# Patient Record
Sex: MALE | Race: WHITE | URBAN - METROPOLITAN AREA
[De-identification: names, ages, dates, MRNs, and addresses within clinical notes are randomized per-mention and may not be internally consistent; named-entity substitution may affect disease eponyms.]

---

## 2017-09-06 ENCOUNTER — OFFICE VISIT (OUTPATIENT)
Dept: GASTROENTEROLOGY | Age: 32
End: 2017-09-06

## 2017-09-06 VITALS
WEIGHT: 292 LBS | SYSTOLIC BLOOD PRESSURE: 132 MMHG | OXYGEN SATURATION: 98 % | HEIGHT: 72 IN | DIASTOLIC BLOOD PRESSURE: 86 MMHG | HEART RATE: 72 BPM | BODY MASS INDEX: 39.55 KG/M2 | TEMPERATURE: 98.1 F | RESPIRATION RATE: 16 BRPM

## 2017-09-06 DIAGNOSIS — K21.9 GASTROESOPHAGEAL REFLUX DISEASE, ESOPHAGITIS PRESENCE NOT SPECIFIED: ICD-10-CM

## 2017-09-06 DIAGNOSIS — E66.9 OBESITY, UNSPECIFIED OBESITY SEVERITY, UNSPECIFIED OBESITY TYPE: ICD-10-CM

## 2017-09-06 DIAGNOSIS — B18.2 HEPATITIS C CARRIER (HCC): Primary | ICD-10-CM

## 2017-09-06 DIAGNOSIS — R79.89 ABNORMAL LFTS: ICD-10-CM

## 2017-09-06 PROCEDURE — 99204 OFFICE O/P NEW MOD 45 MIN: CPT | Performed by: INTERNAL MEDICINE

## 2017-09-06 RX ORDER — OMEPRAZOLE 40 MG/1
40 CAPSULE, DELAYED RELEASE ORAL DAILY
Qty: 30 CAPSULE | Refills: 3 | Status: SHIPPED | OUTPATIENT
Start: 2017-09-06

## 2017-09-06 ASSESSMENT — ENCOUNTER SYMPTOMS
ANAL BLEEDING: 0
EYES NEGATIVE: 1
ABDOMINAL PAIN: 1
ABDOMINAL DISTENTION: 0
RECTAL PAIN: 0
RESPIRATORY NEGATIVE: 1
DIARRHEA: 0
NAUSEA: 0
VOMITING: 0
BLOOD IN STOOL: 0
CONSTIPATION: 0

## 2017-10-11 ENCOUNTER — TELEPHONE (OUTPATIENT)
Dept: GASTROENTEROLOGY | Age: 32
End: 2017-10-11

## 2023-12-09 DIAGNOSIS — I10 ESSENTIAL (PRIMARY) HYPERTENSION: ICD-10-CM

## 2023-12-09 DIAGNOSIS — E03.9 HYPOTHYROIDISM, UNSPECIFIED: ICD-10-CM

## 2023-12-11 RX ORDER — LOSARTAN POTASSIUM 25 MG/1
25 TABLET ORAL DAILY
Qty: 90 TABLET | Refills: 0 | Status: SHIPPED | OUTPATIENT
Start: 2023-12-11 | End: 2024-03-19

## 2023-12-11 RX ORDER — LEVOTHYROXINE SODIUM 175 UG/1
175 TABLET ORAL DAILY
Qty: 90 TABLET | Refills: 1 | Status: SHIPPED | OUTPATIENT
Start: 2023-12-11

## 2024-01-23 ENCOUNTER — APPOINTMENT (OUTPATIENT)
Dept: PRIMARY CARE | Facility: CLINIC | Age: 39
End: 2024-01-23
Payer: COMMERCIAL

## 2024-01-24 ENCOUNTER — LAB (OUTPATIENT)
Dept: LAB | Facility: LAB | Age: 39
End: 2024-01-24
Payer: COMMERCIAL

## 2024-01-24 ENCOUNTER — OFFICE VISIT (OUTPATIENT)
Dept: PRIMARY CARE | Facility: CLINIC | Age: 39
End: 2024-01-24
Payer: COMMERCIAL

## 2024-01-24 VITALS
HEART RATE: 69 BPM | WEIGHT: 279.2 LBS | HEIGHT: 72 IN | DIASTOLIC BLOOD PRESSURE: 89 MMHG | SYSTOLIC BLOOD PRESSURE: 123 MMHG | BODY MASS INDEX: 37.82 KG/M2 | TEMPERATURE: 97.4 F

## 2024-01-24 DIAGNOSIS — Z87.19 HX OF ACUTE PANCREATITIS: ICD-10-CM

## 2024-01-24 DIAGNOSIS — E03.9 HYPOTHYROIDISM, UNSPECIFIED TYPE: ICD-10-CM

## 2024-01-24 DIAGNOSIS — Z00.00 WELL ADULT HEALTH CHECK: ICD-10-CM

## 2024-01-24 DIAGNOSIS — I10 ESSENTIAL HYPERTENSION: ICD-10-CM

## 2024-01-24 DIAGNOSIS — B18.2 CHRONIC HEPATITIS C WITHOUT HEPATIC COMA (MULTI): ICD-10-CM

## 2024-01-24 DIAGNOSIS — K74.00 HEPATIC FIBROSIS: ICD-10-CM

## 2024-01-24 DIAGNOSIS — F17.210 CIGARETTE SMOKER: ICD-10-CM

## 2024-01-24 DIAGNOSIS — F31.9 BIPOLAR DISEASE, CHRONIC (MULTI): ICD-10-CM

## 2024-01-24 DIAGNOSIS — F31.81 BIPOLAR II DISORDER (MULTI): ICD-10-CM

## 2024-01-24 DIAGNOSIS — I10 ESSENTIAL HYPERTENSION: Primary | ICD-10-CM

## 2024-01-24 PROBLEM — R10.9 ABDOMINAL PAIN: Status: ACTIVE | Noted: 2017-07-28

## 2024-01-24 PROBLEM — S67.10XA CRUSH INJURY TO FINGER: Status: ACTIVE | Noted: 2024-01-24

## 2024-01-24 PROBLEM — B34.9 VIRAL SYNDROME: Status: ACTIVE | Noted: 2024-01-24

## 2024-01-24 PROBLEM — E66.01 MORBID OBESITY (MULTI): Status: ACTIVE | Noted: 2017-07-31

## 2024-01-24 PROBLEM — L73.8 OTHER SPECIFIED FOLLICULAR DISORDERS: Status: ACTIVE | Noted: 2020-09-29

## 2024-01-24 PROBLEM — M75.112 NONTRAUMATIC INCOMPLETE TEAR OF LEFT ROTATOR CUFF: Status: ACTIVE | Noted: 2024-01-24

## 2024-01-24 PROBLEM — I35.1 AORTIC VALVE REGURGITATION, ACQUIRED: Status: ACTIVE | Noted: 2024-01-24

## 2024-01-24 PROBLEM — M62.830 MUSCLE SPASM OF BACK: Status: ACTIVE | Noted: 2024-01-24

## 2024-01-24 PROBLEM — R42 VERTIGO: Status: ACTIVE | Noted: 2024-01-24

## 2024-01-24 PROBLEM — M75.82 ROTATOR CUFF TENDONITIS, LEFT: Status: ACTIVE | Noted: 2024-01-24

## 2024-01-24 PROBLEM — B35.4 TINEA CORPORIS: Status: ACTIVE | Noted: 2020-09-29

## 2024-01-24 PROBLEM — M19.90 DJD (DEGENERATIVE JOINT DISEASE): Status: ACTIVE | Noted: 2024-01-24

## 2024-01-24 PROBLEM — H81.399 PERIPHERAL VERTIGO: Status: ACTIVE | Noted: 2024-01-24

## 2024-01-24 PROBLEM — R21 RASH AND OTHER NONSPECIFIC SKIN ERUPTION: Status: ACTIVE | Noted: 2020-09-29

## 2024-01-24 PROBLEM — F41.9 ANXIETY: Status: ACTIVE | Noted: 2024-01-24

## 2024-01-24 PROBLEM — K64.5 THROMBOSED EXTERNAL HEMORRHOID: Status: ACTIVE | Noted: 2024-01-24

## 2024-01-24 PROBLEM — R79.89 ABNORMAL LFTS: Status: ACTIVE | Noted: 2017-07-31

## 2024-01-24 PROBLEM — M54.9 BACK PAIN: Status: ACTIVE | Noted: 2024-01-24

## 2024-01-24 PROBLEM — R07.9 CHEST PAIN: Status: ACTIVE | Noted: 2024-01-24

## 2024-01-24 PROBLEM — K92.1 BLOOD IN STOOL: Status: ACTIVE | Noted: 2024-01-24

## 2024-01-24 PROBLEM — H81.10 BPPV (BENIGN PAROXYSMAL POSITIONAL VERTIGO): Status: ACTIVE | Noted: 2024-01-24

## 2024-01-24 PROBLEM — I77.810 DILATED AORTIC ROOT (CMS-HCC): Status: ACTIVE | Noted: 2024-01-24

## 2024-01-24 PROBLEM — H90.42 SENSORINEURAL HEARING LOSS (SNHL) OF LEFT EAR WITH UNRESTRICTED HEARING OF RIGHT EAR: Status: ACTIVE | Noted: 2024-01-24

## 2024-01-24 PROBLEM — K64.9 HEMORRHOID: Status: ACTIVE | Noted: 2024-01-24

## 2024-01-24 PROBLEM — H92.09 EAR PAIN: Status: ACTIVE | Noted: 2024-01-24

## 2024-01-24 PROBLEM — K62.89 ANORECTAL PAIN: Status: ACTIVE | Noted: 2024-01-24

## 2024-01-24 PROBLEM — M25.512 ACUTE PAIN OF LEFT SHOULDER: Status: ACTIVE | Noted: 2024-01-24

## 2024-01-24 PROBLEM — H90.3 ASYMMETRIC SNHL (SENSORINEURAL HEARING LOSS): Status: ACTIVE | Noted: 2024-01-24

## 2024-01-24 PROBLEM — M75.102 ROTATOR CUFF TEAR, LEFT: Status: ACTIVE | Noted: 2024-01-24

## 2024-01-24 PROBLEM — B19.20 HEPATITIS C VIRUS INFECTION: Status: ACTIVE | Noted: 2024-01-24

## 2024-01-24 PROBLEM — J06.9 URTI (ACUTE UPPER RESPIRATORY INFECTION): Status: ACTIVE | Noted: 2024-01-24

## 2024-01-24 PROBLEM — M99.02 SEGMENTAL AND SOMATIC DYSFUNCTION OF THORACIC REGION: Status: ACTIVE | Noted: 2024-01-24

## 2024-01-24 PROBLEM — B35.1 ONYCHOMYCOSIS OF NAIL OF DIGIT OF HAND: Status: ACTIVE | Noted: 2024-01-24

## 2024-01-24 PROBLEM — K74.02 ADVANCED HEPATIC FIBROSIS: Status: ACTIVE | Noted: 2024-01-24

## 2024-01-24 PROBLEM — M12.819 ROTATOR CUFF ARTHROPATHY: Status: ACTIVE | Noted: 2024-01-24

## 2024-01-24 PROBLEM — E78.5 DYSLIPIDEMIA: Status: ACTIVE | Noted: 2024-01-24

## 2024-01-24 LAB
ALBUMIN SERPL BCP-MCNC: 4.6 G/DL (ref 3.4–5)
ALP SERPL-CCNC: 78 U/L (ref 33–120)
ALT SERPL W P-5'-P-CCNC: 12 U/L (ref 10–52)
ANION GAP SERPL CALC-SCNC: 11 MMOL/L (ref 10–20)
AST SERPL W P-5'-P-CCNC: 14 U/L (ref 9–39)
BASOPHILS # BLD AUTO: 0.05 X10*3/UL (ref 0–0.1)
BASOPHILS NFR BLD AUTO: 0.8 %
BILIRUB SERPL-MCNC: 0.5 MG/DL (ref 0–1.2)
BUN SERPL-MCNC: 8 MG/DL (ref 6–23)
CALCIUM SERPL-MCNC: 9.5 MG/DL (ref 8.6–10.3)
CHLORIDE SERPL-SCNC: 103 MMOL/L (ref 98–107)
CHOLEST SERPL-MCNC: 207 MG/DL (ref 0–199)
CHOLESTEROL/HDL RATIO: 6.7
CO2 SERPL-SCNC: 30 MMOL/L (ref 21–32)
CREAT SERPL-MCNC: 0.91 MG/DL (ref 0.5–1.3)
EGFRCR SERPLBLD CKD-EPI 2021: >90 ML/MIN/1.73M*2
EOSINOPHIL # BLD AUTO: 0.07 X10*3/UL (ref 0–0.7)
EOSINOPHIL NFR BLD AUTO: 1.1 %
ERYTHROCYTE [DISTWIDTH] IN BLOOD BY AUTOMATED COUNT: 12 % (ref 11.5–14.5)
GLUCOSE SERPL-MCNC: 75 MG/DL (ref 74–99)
HCT VFR BLD AUTO: 51 % (ref 41–52)
HDLC SERPL-MCNC: 30.8 MG/DL
HGB BLD-MCNC: 17.5 G/DL (ref 13.5–17.5)
IMM GRANULOCYTES # BLD AUTO: 0.02 X10*3/UL (ref 0–0.7)
IMM GRANULOCYTES NFR BLD AUTO: 0.3 % (ref 0–0.9)
LDLC SERPL CALC-MCNC: 129 MG/DL
LIPASE SERPL-CCNC: 52 U/L (ref 9–82)
LYMPHOCYTES # BLD AUTO: 2.45 X10*3/UL (ref 1.2–4.8)
LYMPHOCYTES NFR BLD AUTO: 36.8 %
MCH RBC QN AUTO: 30.1 PG (ref 26–34)
MCHC RBC AUTO-ENTMCNC: 34.3 G/DL (ref 32–36)
MCV RBC AUTO: 88 FL (ref 80–100)
MONOCYTES # BLD AUTO: 0.5 X10*3/UL (ref 0.1–1)
MONOCYTES NFR BLD AUTO: 7.5 %
NEUTROPHILS # BLD AUTO: 3.57 X10*3/UL (ref 1.2–7.7)
NEUTROPHILS NFR BLD AUTO: 53.5 %
NON HDL CHOLESTEROL: 176 MG/DL (ref 0–149)
NRBC BLD-RTO: 0 /100 WBCS (ref 0–0)
PLATELET # BLD AUTO: 255 X10*3/UL (ref 150–450)
POTASSIUM SERPL-SCNC: 4.4 MMOL/L (ref 3.5–5.3)
PROT SERPL-MCNC: 7.9 G/DL (ref 6.4–8.2)
RBC # BLD AUTO: 5.82 X10*6/UL (ref 4.5–5.9)
SODIUM SERPL-SCNC: 140 MMOL/L (ref 136–145)
THYROPEROXIDASE AB SERPL-ACNC: >1000 IU/ML
TRIGL SERPL-MCNC: 237 MG/DL (ref 0–149)
TSH SERPL-ACNC: 0.52 MIU/L (ref 0.44–3.98)
VLDL: 47 MG/DL (ref 0–40)
WBC # BLD AUTO: 6.7 X10*3/UL (ref 4.4–11.3)

## 2024-01-24 PROCEDURE — 99214 OFFICE O/P EST MOD 30 MIN: CPT | Performed by: INTERNAL MEDICINE

## 2024-01-24 PROCEDURE — 3008F BODY MASS INDEX DOCD: CPT | Performed by: INTERNAL MEDICINE

## 2024-01-24 PROCEDURE — 85025 COMPLETE CBC W/AUTO DIFF WBC: CPT

## 2024-01-24 PROCEDURE — 36415 COLL VENOUS BLD VENIPUNCTURE: CPT

## 2024-01-24 PROCEDURE — 80061 LIPID PANEL: CPT

## 2024-01-24 PROCEDURE — 3079F DIAST BP 80-89 MM HG: CPT | Performed by: INTERNAL MEDICINE

## 2024-01-24 PROCEDURE — 80053 COMPREHEN METABOLIC PANEL: CPT

## 2024-01-24 PROCEDURE — 86376 MICROSOMAL ANTIBODY EACH: CPT

## 2024-01-24 PROCEDURE — 84443 ASSAY THYROID STIM HORMONE: CPT

## 2024-01-24 PROCEDURE — 3074F SYST BP LT 130 MM HG: CPT | Performed by: INTERNAL MEDICINE

## 2024-01-24 PROCEDURE — 83690 ASSAY OF LIPASE: CPT

## 2024-01-24 RX ORDER — ZIPRASIDONE HYDROCHLORIDE 60 MG/1
60 CAPSULE ORAL DAILY
COMMUNITY
Start: 2023-11-10

## 2024-01-24 RX ORDER — TRIAMCINOLONE ACETONIDE 1 MG/G
1 CREAM TOPICAL 2 TIMES DAILY
COMMUNITY
Start: 2020-04-06

## 2024-01-24 ASSESSMENT — PATIENT HEALTH QUESTIONNAIRE - PHQ9
2. FEELING DOWN, DEPRESSED OR HOPELESS: NOT AT ALL
1. LITTLE INTEREST OR PLEASURE IN DOING THINGS: NOT AT ALL
SUM OF ALL RESPONSES TO PHQ9 QUESTIONS 1 AND 2: 0

## 2024-01-24 NOTE — PROGRESS NOTES
Assessment/Plan   Problem List Items Addressed This Visit       Hepatic fibrosis    Bipolar II disorder (CMS/HCC)    Cigarette smoker    Essential hypertension - Primary    Relevant Orders    Comprehensive Metabolic Panel    Lipid Panel    Hypothyroid    Relevant Orders    CBC and Auto Differential    Lipid Panel    TSH with reflex to Free T4 if abnormal    Thyroid Peroxidase (TPO) Antibody    Hx of acute pancreatitis    Relevant Orders    CBC and Auto Differential    Lipase     Other Visit Diagnoses       Well adult health check        Relevant Orders    Comprehensive Metabolic Panel          He had an episode of pancreatitis in February when his lipase was very much elevated but ultrasound of the gallbladder was negative he was not aware of it I am repeating the lipase to make sure we are not missing something important further testing will be dependent upon that  Other labs also being ordered  All these conditions and current medications no change  Follow-up in 3 months time  He had expiratory wheeze in the lungs consistent with the smoke effect and will need a pulmonary function test we will discuss it at the next visit    Subjective   Patient ID: Kofi Lozano is a 38 y.o. male who presents for Follow-up.    Past Surgical History:   Procedure Laterality Date    OTHER SURGICAL HISTORY  09/17/2019    Finger fracture repair    OTHER SURGICAL HISTORY  11/12/2021    Back surgery    OTHER SURGICAL HISTORY  11/12/2021    Hemorrhoidectomy      Family History   Problem Relation Name Age of Onset    Heart disease Mother      Stroke Mother        Social History     Socioeconomic History    Marital status: Single     Spouse name: Not on file    Number of children: Not on file    Years of education: Not on file    Highest education level: Not on file   Occupational History    Not on file   Tobacco Use    Smoking status: Every Day     Packs/day: .5     Types: Cigarettes    Smokeless tobacco: Never   Substance and Sexual  Return call to patient. We are still waiting on the rep and when we are going to be able to do the procedure. Writer explained to the patient. Patient states that he is going to have to think it over, he does not want to wait. States he will wait one more month for an answer of when we will be scheduling the Rezum procedure. Patient has no other questions.   Activity    Alcohol use: Not Currently    Drug use: Yes     Types: Marijuana    Sexual activity: Not on file   Other Topics Concern    Not on file   Social History Narrative    Not on file     Social Determinants of Health     Financial Resource Strain: Not on file   Food Insecurity: Not on file   Transportation Needs: Not on file   Physical Activity: Not on file   Stress: Not on file   Social Connections: Not on file   Intimate Partner Violence: Not on file   Housing Stability: Not on file      Latex   Current Outpatient Medications   Medication Sig Dispense Refill    levothyroxine (Synthroid, Levoxyl) 175 mcg tablet TAKE 1 TABLET BY MOUTH EVERY DAY 90 tablet 1    losartan (Cozaar) 25 mg tablet TAKE 1 TABLET BY MOUTH EVERY DAY 90 tablet 0    triamcinolone (Kenalog) 0.1 % cream Apply 1 Application topically 2 times a day.      ziprasidone (Geodon) 60 mg capsule Take 1 capsule (60 mg) by mouth once daily.       No current facility-administered medications for this visit.      Vitals:    01/24/24 0926   BP: 123/89   BP Location: Left arm   Patient Position: Sitting   Pulse: 69   Temp: 36.3 °C (97.4 °F)   Weight: 127 kg (279 lb 3.2 oz)   Height: 1.829 m (6')      Problem List Items Addressed This Visit       Hepatic fibrosis    Bipolar II disorder (CMS/HCC)    Cigarette smoker    Essential hypertension - Primary    Relevant Orders    Comprehensive Metabolic Panel    Lipid Panel    Hypothyroid    Relevant Orders    CBC and Auto Differential    Lipid Panel    TSH with reflex to Free T4 if abnormal    Thyroid Peroxidase (TPO) Antibody    Hx of acute pancreatitis    Relevant Orders    CBC and Auto Differential    Lipase     Other Visit Diagnoses       Well adult health check        Relevant Orders    Comprehensive Metabolic Panel           Orders Placed This Encounter   Procedures    CBC and Auto Differential     Standing Status:   Future     Standing Expiration Date:   1/24/2025     Order Specific Question:   Release  "result to MyChart     Answer:   Immediate    Comprehensive Metabolic Panel     Standing Status:   Future     Standing Expiration Date:   1/24/2025     Order Specific Question:   Release result to MyChart     Answer:   Immediate    Lipid Panel     Standing Status:   Future     Standing Expiration Date:   1/24/2025     Order Specific Question:   Release result to MyChart     Answer:   Immediate    TSH with reflex to Free T4 if abnormal     Standing Status:   Future     Standing Expiration Date:   1/24/2025     Order Specific Question:   Release result to MyChart     Answer:   Immediate    Lipase     Standing Status:   Future     Standing Expiration Date:   1/24/2025     Order Specific Question:   Release result to MyChart     Answer:   Immediate [1]    Thyroid Peroxidase (TPO) Antibody     Standing Status:   Future     Standing Expiration Date:   1/24/2025     Order Specific Question:   [facility]     Answer:   Immediate [1]        HPI  Came in for periodic review  Eventually he will need refill  Systemic inquiry is negative  Unfortunately continues to smoke    ROS otherwise has been negative  He had abdominal pain in February and he had a lipase elevated which she did not know when he went to the ED and ultrasound was negative for gallstones but he is asymptomatic currently  Past medical history reviewed  Social and family history reviewed  Allergies and medications reviewed  Recent labs reviewed  Vital signs reviewed  PHYSICAL EXAM  Heart sounds regular  Chest shows bilateral expiratory wheeze  Abdomen soft nontender neuro awake alert      No results found for: \"PR1\", \"BMPR1A\", \"CMPLAS\", \"OA1SBJMD\", \"KPSAT\"   Lab Results   Component Value Date    CHOL 169 02/22/2022    CHHDL 5.5 (A) 02/22/2022                "

## 2024-03-17 DIAGNOSIS — I10 ESSENTIAL (PRIMARY) HYPERTENSION: ICD-10-CM

## 2024-03-19 RX ORDER — LOSARTAN POTASSIUM 25 MG/1
25 TABLET ORAL DAILY
Qty: 90 TABLET | Refills: 1 | Status: SHIPPED | OUTPATIENT
Start: 2024-03-19

## 2024-04-24 ENCOUNTER — APPOINTMENT (OUTPATIENT)
Dept: PRIMARY CARE | Facility: CLINIC | Age: 39
End: 2024-04-24
Payer: COMMERCIAL

## 2024-04-30 ENCOUNTER — OFFICE VISIT (OUTPATIENT)
Dept: PRIMARY CARE | Facility: CLINIC | Age: 39
End: 2024-04-30
Payer: COMMERCIAL

## 2024-04-30 VITALS
HEIGHT: 72 IN | SYSTOLIC BLOOD PRESSURE: 127 MMHG | DIASTOLIC BLOOD PRESSURE: 81 MMHG | BODY MASS INDEX: 37.33 KG/M2 | WEIGHT: 275.6 LBS | HEART RATE: 62 BPM | TEMPERATURE: 97.3 F

## 2024-04-30 DIAGNOSIS — Z00.00 WELL ADULT HEALTH CHECK: ICD-10-CM

## 2024-04-30 DIAGNOSIS — F31.81 BIPOLAR II DISORDER (MULTI): ICD-10-CM

## 2024-04-30 DIAGNOSIS — B18.2 CHRONIC HEPATITIS C WITHOUT HEPATIC COMA (MULTI): ICD-10-CM

## 2024-04-30 DIAGNOSIS — F17.210 CIGARETTE SMOKER: ICD-10-CM

## 2024-04-30 DIAGNOSIS — I10 ESSENTIAL HYPERTENSION: Primary | ICD-10-CM

## 2024-04-30 DIAGNOSIS — E03.9 HYPOTHYROIDISM, UNSPECIFIED TYPE: ICD-10-CM

## 2024-04-30 DIAGNOSIS — E78.5 DYSLIPIDEMIA: ICD-10-CM

## 2024-04-30 PROBLEM — K92.1 HEMATOCHEZIA: Status: ACTIVE | Noted: 2024-01-24

## 2024-04-30 PROBLEM — L73.9 FOLLICULITIS: Status: ACTIVE | Noted: 2024-04-30

## 2024-04-30 PROBLEM — Z20.822 CONTACT WITH AND (SUSPECTED) EXPOSURE TO COVID-19: Status: ACTIVE | Noted: 2023-02-23

## 2024-04-30 PROBLEM — K40.90 LEFT INGUINAL HERNIA: Status: ACTIVE | Noted: 2024-04-30

## 2024-04-30 PROBLEM — H91.90 HEARING LOSS: Status: ACTIVE | Noted: 2024-04-30

## 2024-04-30 PROBLEM — R55 PRE-SYNCOPE: Status: ACTIVE | Noted: 2024-04-30

## 2024-04-30 PROBLEM — R31.9 HEMATURIA: Status: ACTIVE | Noted: 2023-02-23

## 2024-04-30 PROBLEM — N20.9 UROLITHIASIS: Status: ACTIVE | Noted: 2024-04-30

## 2024-04-30 PROBLEM — M75.100 NONTRAUMATIC TEAR OF ROTATOR CUFF: Status: ACTIVE | Noted: 2024-04-30

## 2024-04-30 PROBLEM — K64.3 GRADE IV HEMORRHOIDS: Status: ACTIVE | Noted: 2024-04-30

## 2024-04-30 PROCEDURE — 3079F DIAST BP 80-89 MM HG: CPT | Performed by: INTERNAL MEDICINE

## 2024-04-30 PROCEDURE — 3074F SYST BP LT 130 MM HG: CPT | Performed by: INTERNAL MEDICINE

## 2024-04-30 PROCEDURE — 3008F BODY MASS INDEX DOCD: CPT | Performed by: INTERNAL MEDICINE

## 2024-04-30 PROCEDURE — 99214 OFFICE O/P EST MOD 30 MIN: CPT | Performed by: INTERNAL MEDICINE

## 2024-04-30 ASSESSMENT — PATIENT HEALTH QUESTIONNAIRE - PHQ9
2. FEELING DOWN, DEPRESSED OR HOPELESS: NOT AT ALL
SUM OF ALL RESPONSES TO PHQ9 QUESTIONS 1 AND 2: 0
1. LITTLE INTEREST OR PLEASURE IN DOING THINGS: NOT AT ALL

## 2024-04-30 NOTE — PROGRESS NOTES
Assessment/Plan   Problem List Items Addressed This Visit       Bipolar II disorder (Multi)    Cigarette smoker    Dyslipidemia    Essential hypertension - Primary    Relevant Orders    Comprehensive Metabolic Panel    Chronic hepatitis C without hepatic coma (Multi)    Relevant Orders    Comprehensive Metabolic Panel    Hypothyroid    Relevant Orders    TSH with reflex to Free T4 if abnormal    Thyroid Peroxidase (TPO) Antibody    Well adult health check     Other Visit Diagnoses       BMI 37.0-37.9, adult            Advised to do lab work in 3 months and follow-up in 3 months  He continues to have some cough associated with current smoking which is limited  Advised against it  His BMI is elevated diet and exercise and losing weight was discussed    Subjective   Patient ID: Kofi Lozano is a 39 y.o. male who presents for Follow-up (3 months follow up.).    Past Surgical History:   Procedure Laterality Date    OTHER SURGICAL HISTORY  09/17/2019    Finger fracture repair    OTHER SURGICAL HISTORY  11/12/2021    Back surgery    OTHER SURGICAL HISTORY  11/12/2021    Hemorrhoidectomy      Family History   Problem Relation Name Age of Onset    Heart disease Mother      Stroke Mother        Social History     Socioeconomic History    Marital status: Single     Spouse name: Not on file    Number of children: Not on file    Years of education: Not on file    Highest education level: Not on file   Occupational History    Not on file   Tobacco Use    Smoking status: Every Day     Current packs/day: 0.50     Types: Cigarettes    Smokeless tobacco: Never   Substance and Sexual Activity    Alcohol use: Not Currently    Drug use: Yes     Types: Marijuana    Sexual activity: Not on file   Other Topics Concern    Not on file   Social History Narrative    Not on file     Social Determinants of Health     Financial Resource Strain: Not on file   Food Insecurity: Not on file   Transportation Needs: Not on file   Physical Activity:  Not on file   Stress: Not on file   Social Connections: Not on file   Intimate Partner Violence: Not on file   Housing Stability: Not on file      Latex   Current Outpatient Medications   Medication Sig Dispense Refill    levothyroxine (Synthroid, Levoxyl) 175 mcg tablet TAKE 1 TABLET BY MOUTH EVERY DAY 90 tablet 1    losartan (Cozaar) 25 mg tablet TAKE 1 TABLET BY MOUTH EVERY DAY 90 tablet 1    triamcinolone (Kenalog) 0.1 % cream Apply 1 Application topically 2 times a day.      ziprasidone (Geodon) 60 mg capsule Take 1 capsule (60 mg) by mouth once daily.       No current facility-administered medications for this visit.      Vitals:    04/30/24 0925   BP: 127/81   BP Location: Left arm   Patient Position: Sitting   Pulse: 62   Temp: 36.3 °C (97.3 °F)   Weight: 125 kg (275 lb 9.6 oz)   Height: 1.829 m (6')      Problem List Items Addressed This Visit       Bipolar II disorder (Multi)    Cigarette smoker    Dyslipidemia    Essential hypertension - Primary    Relevant Orders    Comprehensive Metabolic Panel    Chronic hepatitis C without hepatic coma (Multi)    Relevant Orders    Comprehensive Metabolic Panel    Hypothyroid    Relevant Orders    TSH with reflex to Free T4 if abnormal    Thyroid Peroxidase (TPO) Antibody    Well adult health check     Other Visit Diagnoses       BMI 37.0-37.9, adult               Orders Placed This Encounter   Procedures    Comprehensive Metabolic Panel     Standing Status:   Future     Standing Expiration Date:   4/30/2025     Order Specific Question:   Release result to MyChart     Answer:   Immediate    TSH with reflex to Free T4 if abnormal     Standing Status:   Future     Standing Expiration Date:   4/30/2025     Order Specific Question:   Release result to MyChart     Answer:   Immediate    Thyroid Peroxidase (TPO) Antibody     Standing Status:   Future     Standing Expiration Date:   4/30/2025     Order Specific Question:   Release result to MyChart     Answer:   Immediate  "[1]        HPI  Came for periodic review  He does not go to hepatologist he said he has been treated for hep C  He is not able to lose weight  Otherwise doing fine  He follows a psychiatrist and conditions is said to be stable    ROS  All systemic inquiry is negative  Past medical history reviewed  Social and family history reviewed  Allergies and medications reviewed  Recent labs reviewed  Vital signs reviewed    PHYSICAL EXAM  Cardiovascular chest abdominal neurological examination is normal      No results found for: \"PR1\", \"BMPR1A\", \"CMPLAS\", \"AV5XLYPO\", \"KPSAT\"   Lab Results   Component Value Date    CHOL 207 (H) 01/24/2024    LDLCALC 129 (H) 01/24/2024    CHHDL 6.7 01/24/2024                "

## 2024-06-12 DIAGNOSIS — E03.9 HYPOTHYROIDISM, UNSPECIFIED: ICD-10-CM

## 2024-06-13 RX ORDER — LEVOTHYROXINE SODIUM 175 UG/1
175 TABLET ORAL DAILY
Qty: 90 TABLET | Refills: 0 | Status: SHIPPED | OUTPATIENT
Start: 2024-06-13

## 2024-09-14 DIAGNOSIS — E03.9 HYPOTHYROIDISM, UNSPECIFIED: ICD-10-CM

## 2024-09-14 DIAGNOSIS — I10 ESSENTIAL (PRIMARY) HYPERTENSION: ICD-10-CM

## 2024-09-17 RX ORDER — LOSARTAN POTASSIUM 25 MG/1
25 TABLET ORAL DAILY
Qty: 90 TABLET | Refills: 0 | Status: SHIPPED | OUTPATIENT
Start: 2024-09-17

## 2024-09-17 RX ORDER — LEVOTHYROXINE SODIUM 175 UG/1
175 TABLET ORAL DAILY
Qty: 90 TABLET | Refills: 0 | Status: SHIPPED | OUTPATIENT
Start: 2024-09-17

## 2024-12-21 DIAGNOSIS — I10 ESSENTIAL (PRIMARY) HYPERTENSION: ICD-10-CM

## 2024-12-21 DIAGNOSIS — E03.9 HYPOTHYROIDISM, UNSPECIFIED: ICD-10-CM

## 2024-12-26 RX ORDER — LEVOTHYROXINE SODIUM 175 UG/1
175 TABLET ORAL DAILY
Qty: 90 TABLET | Refills: 0 | Status: SHIPPED | OUTPATIENT
Start: 2024-12-26

## 2024-12-26 RX ORDER — LOSARTAN POTASSIUM 25 MG/1
25 TABLET ORAL DAILY
Qty: 90 TABLET | Refills: 0 | Status: SHIPPED | OUTPATIENT
Start: 2024-12-26

## 2025-03-25 DIAGNOSIS — E03.9 HYPOTHYROIDISM, UNSPECIFIED: ICD-10-CM

## 2025-03-25 DIAGNOSIS — I10 ESSENTIAL (PRIMARY) HYPERTENSION: ICD-10-CM

## 2025-03-27 RX ORDER — LEVOTHYROXINE SODIUM 175 UG/1
175 TABLET ORAL DAILY
Qty: 90 TABLET | Refills: 0 | Status: SHIPPED | OUTPATIENT
Start: 2025-03-27

## 2025-03-27 RX ORDER — LOSARTAN POTASSIUM 25 MG/1
25 TABLET ORAL DAILY
Qty: 90 TABLET | Refills: 0 | Status: SHIPPED | OUTPATIENT
Start: 2025-03-27

## 2025-04-22 ENCOUNTER — APPOINTMENT (OUTPATIENT)
Dept: PRIMARY CARE | Facility: CLINIC | Age: 40
End: 2025-04-22
Payer: COMMERCIAL

## 2025-04-22 VITALS
DIASTOLIC BLOOD PRESSURE: 82 MMHG | HEART RATE: 71 BPM | OXYGEN SATURATION: 97 % | WEIGHT: 293 LBS | TEMPERATURE: 97.3 F | HEIGHT: 72 IN | SYSTOLIC BLOOD PRESSURE: 139 MMHG | BODY MASS INDEX: 39.68 KG/M2

## 2025-04-22 DIAGNOSIS — E66.09 CLASS 2 OBESITY DUE TO EXCESS CALORIES WITH BODY MASS INDEX (BMI) OF 39.0 TO 39.9 IN ADULT, UNSPECIFIED WHETHER SERIOUS COMORBIDITY PRESENT: ICD-10-CM

## 2025-04-22 DIAGNOSIS — Z00.00 WELL ADULT HEALTH CHECK: ICD-10-CM

## 2025-04-22 DIAGNOSIS — E03.9 HYPOTHYROIDISM, UNSPECIFIED TYPE: ICD-10-CM

## 2025-04-22 DIAGNOSIS — E66.812 CLASS 2 OBESITY DUE TO EXCESS CALORIES WITH BODY MASS INDEX (BMI) OF 39.0 TO 39.9 IN ADULT, UNSPECIFIED WHETHER SERIOUS COMORBIDITY PRESENT: ICD-10-CM

## 2025-04-22 DIAGNOSIS — E78.5 DYSLIPIDEMIA: ICD-10-CM

## 2025-04-22 DIAGNOSIS — Z87.891 FORMER SMOKER: ICD-10-CM

## 2025-04-22 DIAGNOSIS — I10 ESSENTIAL HYPERTENSION: ICD-10-CM

## 2025-04-22 PROCEDURE — 3075F SYST BP GE 130 - 139MM HG: CPT | Performed by: INTERNAL MEDICINE

## 2025-04-22 PROCEDURE — 99396 PREV VISIT EST AGE 40-64: CPT | Performed by: INTERNAL MEDICINE

## 2025-04-22 PROCEDURE — 3079F DIAST BP 80-89 MM HG: CPT | Performed by: INTERNAL MEDICINE

## 2025-04-22 PROCEDURE — 3008F BODY MASS INDEX DOCD: CPT | Performed by: INTERNAL MEDICINE

## 2025-04-22 PROCEDURE — 1036F TOBACCO NON-USER: CPT | Performed by: INTERNAL MEDICINE

## 2025-04-22 ASSESSMENT — ENCOUNTER SYMPTOMS
OCCASIONAL FEELINGS OF UNSTEADINESS: 0
LOSS OF SENSATION IN FEET: 0
DEPRESSION: 0

## 2025-04-22 NOTE — PROGRESS NOTES
Assessment/Plan   Problem List Items Addressed This Visit       Dyslipidemia    Relevant Orders    Lipid Panel    Essential hypertension    Relevant Orders    Comprehensive Metabolic Panel    Hypothyroid    Relevant Orders    Thyroid Stimulating Hormone    T4, free    Well adult health check    Relevant Orders    Hemoglobin A1C    Comprehensive Metabolic Panel    CBC    Former smoker     Other Visit Diagnoses         Class 2 obesity due to excess calories with body mass index (BMI) of 39.0 to 39.9 in adult, unspecified whether serious comorbidity present        Relevant Orders    Referral to Bariatric Surgery    Referral to Nutrition Services        Discussed the preventive care  He had to stop smoking  Weight: 133 kg (293 lb)   Is overweight too much  Advised to see bariatrics dietitian for diet exercise  Also optimization of thyroid status is important  Labs has not been done must be done as ordered  Follow-up in 3 months unless otherwise indicated    Subjective   Patient ID: Kofi Lozano is a 40 y.o. male who presents for Annual Exam (No concerns).    Surgical History[1]   Family History[2]   Social History     Socioeconomic History    Marital status: Single     Spouse name: Not on file    Number of children: Not on file    Years of education: Not on file    Highest education level: Not on file   Occupational History    Not on file   Tobacco Use    Smoking status: Former     Current packs/day: 0.00     Types: Cigarettes     Quit date: 2024     Years since quittin.5    Smokeless tobacco: Never   Vaping Use    Vaping status: Every Day   Substance and Sexual Activity    Alcohol use: Not Currently    Drug use: Yes     Types: Marijuana    Sexual activity: Not on file   Other Topics Concern    Not on file   Social History Narrative    Not on file     Social Drivers of Health     Financial Resource Strain: Not on file   Food Insecurity: Not on file   Transportation Needs: Not on file   Physical Activity: Not  on file   Stress: Not on file   Social Connections: Not on file   Intimate Partner Violence: Not on file   Housing Stability: Not on file      Latex   Current Rx[3]   Vitals:    04/22/25 0918   BP: 139/82   BP Location: Left arm   Patient Position: Sitting   Pulse: 71   Temp: 36.3 °C (97.3 °F)   TempSrc: Skin   SpO2: 97%   Weight: 133 kg (293 lb)   Height: 1.829 m (6')      Problem List Items Addressed This Visit       Dyslipidemia    Relevant Orders    Lipid Panel    Essential hypertension    Relevant Orders    Comprehensive Metabolic Panel    Hypothyroid    Relevant Orders    Thyroid Stimulating Hormone    T4, free    Well adult health check    Relevant Orders    Hemoglobin A1C    Comprehensive Metabolic Panel    CBC    Former smoker     Other Visit Diagnoses         Class 2 obesity due to excess calories with body mass index (BMI) of 39.0 to 39.9 in adult, unspecified whether serious comorbidity present        Relevant Orders    Referral to Bariatric Surgery    Referral to Nutrition Services           Orders Placed This Encounter   Procedures    Lipid Panel     Standing Status:   Future     Number of Occurrences:   1     Expected Date:   4/22/2025     Expiration Date:   4/22/2026     Release result to OSSIANIX[facility]:   Immediate    Hemoglobin A1C     Standing Status:   Future     Number of Occurrences:   1     Expected Date:   4/22/2025     Expiration Date:   4/22/2026     Release result to [facility]asc[facility]:   Immediate    Thyroid Stimulating Hormone     Standing Status:   Future     Number of Occurrences:   1     Expected Date:   4/22/2025     Expiration Date:   4/22/2026     Release result to [facility]:   Immediate    Comprehensive Metabolic Panel     Standing Status:   Future     Number of Occurrences:   1     Expected Date:   4/22/2025     Expiration Date:   4/22/2026     Release result to OSSIANIXt:   Immediate    CBC     Standing Status:   Future     Number of Occurrences:   1     Expected Date:   4/22/2025     Expiration  "Date:   4/22/2026     Release result to Captalis:   Immediate    T4, free     Standing Status:   Future     Number of Occurrences:   1     Expected Date:   4/22/2025     Expiration Date:   4/22/2026     Release result to Captalis:   Immediate [1]    Referral to Bariatric Surgery     Standing Status:   Future     Expected Date:   4/22/2025     Expiration Date:   4/22/2026     Referral Priority:   Routine     Referral Type:   Consultation     Referral Reason:   Specialty Services Required     Requested Specialty:   Bariatrics     Number of Visits Requested:   1    Referral to Nutrition Services     Standing Status:   Future     Expected Date:   4/22/2025     Expiration Date:   4/22/2026     Referral Priority:   Routine     Referral Type:   Consultation     Referral Reason:   Specialty Services Required     Requested Specialty:   Nutrition     Number of Visits Requested:   1        HPI  18-year-old gentleman with history of hypertension obesity hypothyroidism with Hashimoto's thyroiditis as well as bipolar disorder  He says his mood is fine he has been under care of psychiatrist  He has stopped smoking but he is unable to lose weight and he was seen in    ROS  Negative  Past medical history reviewed  Social and family history reviewed  Allergies and medications reviewed  Recent labs reviewed  Vital signs reviewed  PHYSICAL EXAM  Vascular chest abdominal neurological exam changes normal      No results found for: \"PR1\", \"BMPR1A\", \"CMPLAS\", \"II2TFVKT\", \"KPSAT\"   Lab Results   Component Value Date    CHOL 207 (H) 01/24/2024    LDLCALC 129 (H) 01/24/2024    CHHDL 6.7 01/24/2024     Lab Results   Component Value Date    TSH 0.52 01/24/2024    HGBA1C 5.1 12/29/2020              === 12/29/20 ===    CT CHEST ANGIO W AND WO IV CONTRAST    - Impression -  Mild aneurysmal dilatation of the ascending thoracic aorta measuring  4.0 x 4.1 cm. No evidence for intramural hematoma or aortic  dissection.    Additional findings as described " above.    Results for orders placed in visit on 12/29/20    Echocardiogram    Narrative  [facility] [place]  [address] [address], [facility] [address]  Tel [phone] Fax [phone]    TRANSTHORACIC ECHOCARDIOGRAM REPORT      Patient Name:     JOCE Loredo Physician:   [id number] Lavelle Ayoub MD  Study Date:       12/29/2020         Referring Physician: [id number] Washington  MRN/PID:          [id number]           PCP:  Accession/Order#: [id number]          Department Location: [address]  YOB: 1985           Fellow:  Gender:           M                  Nurse:  Admit Date:       12/29/2020         Sonographer:         Debbie Lopez RDCS  Admission Status: Inpatient -        Additional Staff:  Routine  Height:           182.00 cm          CC Report to:  Weight:           129.00 kg          Study Type:          Echocardiogram  BSA:              2.47 m2  Blood Pressure: 141 /81 mmHg    Diagnosis/ICD: R07.89-Other chest pain  Indication:  Procedure/CPT: Echo Complete w Full [id number]  Study Detail: The following Echo studies were performed: 2D, M-Mode, Doppler and  color flow.      PHYSICIAN INTERPRETATION:  Left Ventricle: The left ventricular systolic function is normal. Estimated left ventricular mass is normal. There are no regional wall motion abnormalities. The left ventricular cavity size is normal. The left ventricular septal wall thickness is normal. There is normal left ventricular posterior wall thickness. Spectral Doppler shows a normal pattern of left ventricular diastolic filling. There are normal left ventricular filling pressures.  LV Wall Scoring:  All segments are normal.    Left Atrium: The left atrium is normal in size.  Right Ventricle: The right ventricle is normal in size. There is normal right ventricular global systolic function.  Right Atrium: The right atrium is normal in size.  Aortic Valve: The aortic valve appears abnormal. There is mild aortic valve cusp  calcification. There is mild aortic valve thickening. There is mild aortic valve regurgitation. The peak instantaneous gradient of the aortic valve is 9.1 mmHg. Eccentric AR towards anterior leaflet mild, morphology of Aortic valve uncertain, can not fully exclude bicuspid or pseudobicuspid with Non coronary and R cusp raphe fusion, could be just calcified R cusp of trileaflet valve, give mild aortopathy consider former, if indicated additional imaging.  Mitral Valve: The mitral valve is normal in structure. There is no evidence of mitral valve regurgitation.  Tricuspid Valve: The tricuspid valve is structurally normal. No evidence of tricuspid regurgitation. The Doppler estimated RVSP is within normal limits at 31.1 mmHg.  Pulmonic Valve: The pulmonic valve is structurally normal. There is no indication of pulmonic valve regurgitation.  Pericardium: There is no pericardial effusion noted.  Aorta: The aortic root is normal. There is mild dilatation of the ascending aorta. There is mild dilatation of the aortic root. Mild aortopathy root 3.57 and asc 4.2, arch not seen.  Systemic Veins: The inferior vena cava appears to be of normal size. The hepatic vein appears to be of normal size. There is IVC inspiratory collapse greater than 50%.      CONCLUSIONS:  1. The left ventricular systolic function is normal.  2. RVSP within normal limits.  3. Eccentric AR towards anterior leaflet mild, morphology of Aortic valve uncertain, can not fully exclude bicuspid or pseudobicuspid with Non coronary and R cusp raphe fusion, could be just calcified R cusp of trileaflet valve, give mild aortopathy consider former, if indicated additional imaging.  4. There is mild aortic valve regurgitation.  5. Mild aortopathy root 3.57 and asc 4.2, arch not seen.    QUANTITATIVE DATA SUMMARY:  2D MEASUREMENTS:  Normal Ranges:  LAs:           3.60 cm    (2.7-4.0cm)  IVSd:          1.22 cm    (0.6-1.1cm)  LVPWd:         1.16 cm     (0.6-1.1cm)  LVIDd:         5.31 cm    (3.9-5.9cm)  LVIDs:         3.73 cm  LV Mass Index: 103.2 g/m2  LV % FS        29.8 %    LA VOLUME:  Normal Ranges:  LA Area A4C:     19.6 cm2  LA Area A2C:     15.0 cm2  LA Volume Index: 16.2 ml/m2  LA Vol A4C:      50.0 ml  LA Vol A2C:      40.0 ml    M-MODE MEASUREMENTS:  Normal Ranges:  Ao Root: 3.20 cm (2.0-3.7cm)  AoV Exc: 1.50 cm (1.5-2.5cm)    AORTA MEASUREMENTS:  Normal Ranges:  AoV Exc:   1.50 cm (1.5-2.5cm)  Asc Ao, d: 4.20 cm (2.1-3.4cm)    LV SYSTOLIC FUNCTION BY 2D PLANIMETRY (MOD):  Normal Ranges:  EF-A4C View: 49.5 % (>55%)  EF-A2C View: 62.6 %  EF-Biplane:  59.3 %    LV DIASTOLIC FUNCTION:  Normal Ranges:  MV Peak E:    1.06 m/s (0.7-1.2 m/s)  MV Peak A:    0.64 m/s (0.42-0.7 m/s)  E/A Ratio:    1.66     (1.0-2.2)  MV lateral e' 0.22 m/s  MV medial e'  0.13 m/s  E/e' Ratio:   4.90     (<8.0)    MITRAL VALVE:  Normal Ranges:  MV DT: 201 msec (150-240msec)    AORTIC VALVE:  Normal Ranges:  AoV Vmax:      1.51 m/s (<1.7m/s)  AoV Peak P.1 mmHg (<20mmHg)  LVOT Max [place]ane:  1.14 m/s (<1.1m/s)  LVOT Diameter: 2.20 cm  (1.8-2.4cm)  AoV Area,Vmax: 2.87 cm2 (2.5-4.5cm2)    AORTIC INSUFFICIENCY:  AI Vmax:       3.42 m/s  AI Half-time:  503 msec  AI Decel Rate: 196.33 cm/s2    RIGHT VENTRICLE:  RV 1   3.4 cm  RV 2   2.7 cm  RV 3   7.1 cm  TAPSE: 20.0 mm    TRICUSPID VALVE/RVSP:  Normal Ranges:  Peak TR Velocity: 2.65 m/s  RV Syst Pressure: 31.1 mmHg (< 30mmHg)      [id number] Lavelle Ayoub MD  Electronically signed on 2020 at 12:04:08 PM      Wall Scoring                              [1]   Past Surgical History:  Procedure Laterality Date    OTHER SURGICAL HISTORY  2019    Finger fracture repair    OTHER SURGICAL HISTORY  2021    Back surgery    OTHER SURGICAL HISTORY  2021    Hemorrhoidectomy   [2]   Family History  Problem Relation Name Age of Onset    Heart disease Mother      Stroke Mother     [3]   Current Outpatient Medications   Medication Sig  Dispense Refill    levothyroxine (Synthroid, Levoxyl) 175 mcg tablet TAKE 1 TABLET BY MOUTH EVERY DAY 90 tablet 0    losartan (Cozaar) 25 mg tablet TAKE 1 TABLET BY MOUTH EVERY DAY 90 tablet 0    triamcinolone (Kenalog) 0.1 % cream Apply 1 Application topically 2 times a day.       No current facility-administered medications for this visit.

## 2025-05-10 LAB
ALBUMIN SERPL-MCNC: 4.8 G/DL (ref 3.6–5.1)
ALP SERPL-CCNC: 59 U/L (ref 36–130)
ALT SERPL-CCNC: 20 U/L (ref 9–46)
ANION GAP SERPL CALCULATED.4IONS-SCNC: 8 MMOL/L (CALC) (ref 7–17)
AST SERPL-CCNC: 17 U/L (ref 10–40)
BILIRUB SERPL-MCNC: 0.4 MG/DL (ref 0.2–1.2)
BUN SERPL-MCNC: 9 MG/DL (ref 7–25)
CALCIUM SERPL-MCNC: 9.5 MG/DL (ref 8.6–10.3)
CHLORIDE SERPL-SCNC: 103 MMOL/L (ref 98–110)
CHOLEST SERPL-MCNC: 222 MG/DL
CHOLEST/HDLC SERPL: 6.3 (CALC)
CO2 SERPL-SCNC: 27 MMOL/L (ref 20–32)
CREAT SERPL-MCNC: 0.83 MG/DL (ref 0.6–1.29)
EGFRCR SERPLBLD CKD-EPI 2021: 112 ML/MIN/1.73M2
ERYTHROCYTE [DISTWIDTH] IN BLOOD BY AUTOMATED COUNT: 12 % (ref 11–15)
EST. AVERAGE GLUCOSE BLD GHB EST-MCNC: 111 MG/DL
EST. AVERAGE GLUCOSE BLD GHB EST-SCNC: 6.2 MMOL/L
GLUCOSE SERPL-MCNC: 82 MG/DL (ref 65–139)
HBA1C MFR BLD: 5.5 %
HCT VFR BLD AUTO: 48.5 % (ref 38.5–50)
HDLC SERPL-MCNC: 35 MG/DL
HGB BLD-MCNC: 16.3 G/DL (ref 13.2–17.1)
LDLC SERPL CALC-MCNC: 146 MG/DL (CALC)
MCH RBC QN AUTO: 29.8 PG (ref 27–33)
MCHC RBC AUTO-ENTMCNC: 33.6 G/DL (ref 32–36)
MCV RBC AUTO: 88.7 FL (ref 80–100)
NONHDLC SERPL-MCNC: 187 MG/DL (CALC)
PLATELET # BLD AUTO: 258 THOUSAND/UL (ref 140–400)
PMV BLD REES-ECKER: 10.8 FL (ref 7.5–12.5)
POTASSIUM SERPL-SCNC: 4.4 MMOL/L (ref 3.5–5.3)
PROT SERPL-MCNC: 7.8 G/DL (ref 6.1–8.1)
RBC # BLD AUTO: 5.47 MILLION/UL (ref 4.2–5.8)
SODIUM SERPL-SCNC: 138 MMOL/L (ref 135–146)
T4 FREE SERPL-MCNC: 1.5 NG/DL (ref 0.8–1.8)
TRIGL SERPL-MCNC: 269 MG/DL
TSH SERPL-ACNC: 0.56 MIU/L (ref 0.4–4.5)
WBC # BLD AUTO: 4.9 THOUSAND/UL (ref 3.8–10.8)

## 2025-06-28 DIAGNOSIS — I10 ESSENTIAL (PRIMARY) HYPERTENSION: ICD-10-CM

## 2025-06-28 DIAGNOSIS — E03.9 HYPOTHYROIDISM, UNSPECIFIED: ICD-10-CM

## 2025-07-01 RX ORDER — LEVOTHYROXINE SODIUM 175 UG/1
175 TABLET ORAL DAILY
Qty: 90 TABLET | Refills: 1 | Status: SHIPPED | OUTPATIENT
Start: 2025-07-01

## 2025-07-01 RX ORDER — LOSARTAN POTASSIUM 25 MG/1
25 TABLET ORAL DAILY
Qty: 90 TABLET | Refills: 1 | Status: SHIPPED | OUTPATIENT
Start: 2025-07-01

## 2025-07-14 ENCOUNTER — APPOINTMENT (OUTPATIENT)
Dept: PRIMARY CARE | Facility: CLINIC | Age: 40
End: 2025-07-14
Payer: COMMERCIAL

## 2025-07-22 ENCOUNTER — APPOINTMENT (OUTPATIENT)
Dept: PRIMARY CARE | Facility: CLINIC | Age: 40
End: 2025-07-22
Payer: COMMERCIAL